# Patient Record
Sex: FEMALE | Race: AMERICAN INDIAN OR ALASKA NATIVE
[De-identification: names, ages, dates, MRNs, and addresses within clinical notes are randomized per-mention and may not be internally consistent; named-entity substitution may affect disease eponyms.]

---

## 2017-10-25 ENCOUNTER — HOSPITAL ENCOUNTER (OUTPATIENT)
Dept: HOSPITAL 5 - SPVIMAG | Age: 72
Discharge: HOME | End: 2017-10-25
Attending: OTOLARYNGOLOGY
Payer: MEDICARE

## 2017-10-25 DIAGNOSIS — R42: ICD-10-CM

## 2017-10-25 DIAGNOSIS — G31.89: Primary | ICD-10-CM

## 2017-10-25 PROCEDURE — 70551 MRI BRAIN STEM W/O DYE: CPT

## 2017-10-25 NOTE — MAGNETIC RESONANCE REPORT
MRI BRAIN WITHOUT CONTRAST: 10/25/17 11:31:00



CLINICAL: Dizziness.



TECHNIQUE: Axial diffusion, T1, T2, FLAIR, gradient echo T2*, and 

sagittal T1 sequences on a 1.5 Rylee magnet.  



FINDINGS: The ventricles are normal size.  Global enlargement of 

cerebral sulci which is disproportionate to ventricular size.  No 

hippocampal atrophy.  No restricted diffusion.  No mass or mass effect. 

 No hemorrhage, edema or extra-axial collection.  Normal pituitary and 

optic chiasm.  The brainstem and cerebellum are normal.  Intact 

vascular flow voids.  The sinuses are clear. The orbits,  and soft 

tissues are normal.  Normal calvarium and skull base.



IMPRESSION: Global cerebral cortical atrophy but otherwise normal.

## 2020-09-26 ENCOUNTER — HOSPITAL ENCOUNTER (OUTPATIENT)
Dept: HOSPITAL 5 - ED | Age: 75
Setting detail: OBSERVATION
LOS: 2 days | Discharge: HOSPICE | End: 2020-09-28
Attending: INTERNAL MEDICINE | Admitting: INTERNAL MEDICINE
Payer: MEDICARE

## 2020-09-26 DIAGNOSIS — Z79.02: ICD-10-CM

## 2020-09-26 DIAGNOSIS — G93.41: ICD-10-CM

## 2020-09-26 DIAGNOSIS — D64.9: ICD-10-CM

## 2020-09-26 DIAGNOSIS — Z90.49: ICD-10-CM

## 2020-09-26 DIAGNOSIS — I10: ICD-10-CM

## 2020-09-26 DIAGNOSIS — R53.81: ICD-10-CM

## 2020-09-26 DIAGNOSIS — E46: ICD-10-CM

## 2020-09-26 DIAGNOSIS — F02.80: ICD-10-CM

## 2020-09-26 DIAGNOSIS — R19.5: ICD-10-CM

## 2020-09-26 DIAGNOSIS — D47.3: ICD-10-CM

## 2020-09-26 DIAGNOSIS — R41.82: Primary | ICD-10-CM

## 2020-09-26 DIAGNOSIS — Z96.642: ICD-10-CM

## 2020-09-26 DIAGNOSIS — E87.6: ICD-10-CM

## 2020-09-26 DIAGNOSIS — Z98.890: ICD-10-CM

## 2020-09-26 DIAGNOSIS — Z79.82: ICD-10-CM

## 2020-09-26 DIAGNOSIS — Z79.01: ICD-10-CM

## 2020-09-26 LAB
ALBUMIN SERPL-MCNC: 3.1 G/DL (ref 3.9–5)
ALT SERPL-CCNC: 27 UNITS/L (ref 7–56)
APTT BLD: 29.4 SEC. (ref 24.2–36.6)
BACTERIA #/AREA URNS HPF: (no result) /HPF
BASOPHILS # (AUTO): 0.1 K/MM3 (ref 0–0.1)
BASOPHILS NFR BLD AUTO: 1.3 % (ref 0–1.8)
BILIRUB UR QL STRIP: (no result)
BLOOD UR QL VISUAL: (no result)
BUN SERPL-MCNC: 12 MG/DL (ref 7–17)
BUN/CREAT SERPL: 24 %
CALCIUM SERPL-MCNC: 8.8 MG/DL (ref 8.4–10.2)
EOSINOPHIL # BLD AUTO: 0 K/MM3 (ref 0–0.4)
EOSINOPHIL NFR BLD AUTO: 0.3 % (ref 0–4.3)
HCT VFR BLD CALC: 29.7 % (ref 30.3–42.9)
HEMOLYSIS INDEX: 12
HGB BLD-MCNC: 9.9 GM/DL (ref 10.1–14.3)
INR PPP: 1.02 (ref 0.87–1.13)
LYMPHOCYTES # BLD AUTO: 0.9 K/MM3 (ref 1.2–5.4)
LYMPHOCYTES NFR BLD AUTO: 15 % (ref 13.4–35)
MCHC RBC AUTO-ENTMCNC: 33 % (ref 30–34)
MCV RBC AUTO: 96 FL (ref 79–97)
MONOCYTES # (AUTO): 0.6 K/MM3 (ref 0–0.8)
MONOCYTES % (AUTO): 8.8 % (ref 0–7.3)
PH UR STRIP: 7 [PH] (ref 5–7)
PLATELET # BLD: 682 K/MM3 (ref 140–440)
PROT UR STRIP-MCNC: (no result) MG/DL
RBC # BLD AUTO: 3.08 M/MM3 (ref 3.65–5.03)
RBC #/AREA URNS HPF: 3 /HPF (ref 0–6)
T4 FREE SERPL-MCNC: 1.23 NG/DL (ref 0.76–1.46)
UROBILINOGEN UR-MCNC: 4 MG/DL (ref ?–2)
WBC #/AREA URNS HPF: 0 /HPF (ref 0–6)

## 2020-09-26 PROCEDURE — 84439 ASSAY OF FREE THYROXINE: CPT

## 2020-09-26 PROCEDURE — 97166 OT EVAL MOD COMPLEX 45 MIN: CPT

## 2020-09-26 PROCEDURE — 70450 CT HEAD/BRAIN W/O DYE: CPT

## 2020-09-26 PROCEDURE — 85610 PROTHROMBIN TIME: CPT

## 2020-09-26 PROCEDURE — 82550 ASSAY OF CK (CPK): CPT

## 2020-09-26 PROCEDURE — 80053 COMPREHEN METABOLIC PANEL: CPT

## 2020-09-26 PROCEDURE — 36415 COLL VENOUS BLD VENIPUNCTURE: CPT

## 2020-09-26 PROCEDURE — 96361 HYDRATE IV INFUSION ADD-ON: CPT

## 2020-09-26 PROCEDURE — 71045 X-RAY EXAM CHEST 1 VIEW: CPT

## 2020-09-26 PROCEDURE — 82962 GLUCOSE BLOOD TEST: CPT

## 2020-09-26 PROCEDURE — 96376 TX/PRO/DX INJ SAME DRUG ADON: CPT

## 2020-09-26 PROCEDURE — G0378 HOSPITAL OBSERVATION PER HR: HCPCS

## 2020-09-26 PROCEDURE — 83735 ASSAY OF MAGNESIUM: CPT

## 2020-09-26 PROCEDURE — 84484 ASSAY OF TROPONIN QUANT: CPT

## 2020-09-26 PROCEDURE — 97161 PT EVAL LOW COMPLEX 20 MIN: CPT

## 2020-09-26 PROCEDURE — 96374 THER/PROPH/DIAG INJ IV PUSH: CPT

## 2020-09-26 PROCEDURE — 99285 EMERGENCY DEPT VISIT HI MDM: CPT

## 2020-09-26 PROCEDURE — 84443 ASSAY THYROID STIM HORMONE: CPT

## 2020-09-26 PROCEDURE — 82271 OCCULT BLOOD OTHER SOURCES: CPT

## 2020-09-26 PROCEDURE — C9113 INJ PANTOPRAZOLE SODIUM, VIA: HCPCS

## 2020-09-26 PROCEDURE — 80048 BASIC METABOLIC PNL TOTAL CA: CPT

## 2020-09-26 PROCEDURE — 81001 URINALYSIS AUTO W/SCOPE: CPT

## 2020-09-26 PROCEDURE — 85025 COMPLETE CBC W/AUTO DIFF WBC: CPT

## 2020-09-26 PROCEDURE — 93005 ELECTROCARDIOGRAM TRACING: CPT

## 2020-09-26 PROCEDURE — 73502 X-RAY EXAM HIP UNI 2-3 VIEWS: CPT

## 2020-09-26 PROCEDURE — 85730 THROMBOPLASTIN TIME PARTIAL: CPT

## 2020-09-26 NOTE — XRAY REPORT
CHEST 1 VIEW



INDICATION / CLINICAL INFORMATION:

cough.



COMPARISON: 

9/10/2020



FINDINGS:



SUPPORT DEVICES: None.



HEART / MEDIASTINUM: Stable. 



LUNGS / PLEURA: Previously noted chronic appearing interstitial changes are similar. Biapical pleural
 thickening is similar. No evidence of confluent infiltrate or pleural effusion. No pneumothorax. 



ADDITIONAL FINDINGS: Gas-filled and distended bowel loops are noted in the left upper quadrant of the
 abdomen.



IMPRESSION:

1. No acute findings. No significant interval change.



Signer Name: Moo Bonds MD 

Signed: 9/26/2020 10:24 PM

Workstation Name: [id number]

## 2020-09-26 NOTE — EMERGENCY DEPARTMENT REPORT
ED Altered Mental Status HPI





- General


Stated Complaint: FAILURE TO THRIVE


Time Seen by Provider: 09/26/20 20:45


Source: patient, EMS


Mode of arrival: Stretcher


Limitations: Altered Mental Status





- History of Present Illness


Initial Comments: 





75-year-old female with a past medical history of Alzheimer's dementia, 

hypertension, protein calorie malnutrition, and admission here September 10 

until September 18 with left hip fracture after fall status post Left Bipolar 

Hemiarthroplasty presents to the hospital for altered mental status and mental 

status decline.  Collateral information obtained from patient's daughter Patrice.

 Patient lives at home with her family.  Since she is been discharged from the 

hospital to 18 she has had a steady decline in her mental status and has been 

more confused.  Patient only eats 3 to 4 teaspoons of food at a time and has 

trouble swallowing pills.  She also reports that patient has had diarrhea since 

discharge from the hospital and has recently noted some blood in her stool.  

Persistent cough reported without fever.  Today patient would not wake up this 

evening to eat dinner.  She states upon EMS arrival she was in and out of sleep.

 Patient is awake here and responsive and oriented only to self.  She denies any

physical complaints.  Daughter also states patient has been unable to ambulate 

since surgery.  She has had initial assessment from physical therapist and 

patient cannot even stand.  They have been trying to see Dr. Andrade since his 

surgery but been having trouble arranging for transportation.  They do not have 

any home health nurse/assistant help at home.  As per discharge med list review 

patient was discharged on enoxaparin 40 mg subcu daily.





- Related Data


                                  Previous Rx's











 Medication  Instructions  Recorded  Last Taken  Type


 


Acetaminophen [Acetaminophen TAB] 650 mg PO Q4H PRN  tablet 09/17/20 Unknown Rx


 


Enoxaparin 40 mg SUB-Q QDAY  syringe 09/17/20 Unknown Rx


 


Latanoprost 0.005% 0.005 drops OU QHS  bottle 09/17/20 Unknown Rx


 


Magnesium Hydroxide [Milk of 30 ml PO Q4H PRN  oral.liqd 09/17/20 Unknown Rx





Magnesia]    


 


Memantine 5 mg PO BID  tablet 09/17/20 Unknown Rx


 


amLODIPine 5 mg PO QHS  tablet 09/17/20 Unknown Rx


 


risperiDONE [RisperDAL] 0.25 mg PO QHS  tablet 09/17/20 Unknown Rx


 


Amlodipine Besylate [Norvasc] 5 mg PO DAILY #30 tablet 09/18/20 Unknown Rx


 


Aspirin 81 mg PO DAILY #30 09/18/20 Unknown Rx


 


Metoprolol 12.5 mg PO BID #60 tab 09/18/20 Unknown Rx


 


risperiDONE 0.25 mg PO QHS #30 cap 09/18/20 Unknown Rx











                                    Allergies











Allergy/AdvReac Type Severity Reaction Status Date / Time


 


codeine Allergy  Unknown Verified 09/10/20 19:29


 


Latex, Natural Rubber Allergy  Rash Verified 11/06/14 13:31


 


morphine Allergy  "JUST Verified 11/06/14 13:31





   MAKES ME  





   FEEL SICK"  














ED Review of Systems


ROS: 


Stated complaint: FAILURE TO THRIVE


Other details as noted in HPI





Comment: All other systems reviewed and negative





ED Past Medical Hx





- Past Medical History


Hx Hypertension: Yes


Hx Heart Attack/AMI: No


Hx Liver Disease: No


Hx Renal Disease: No


Hx Sickle Cell Disease: No


Hx Seizures: No


Hx Asthma: No


Hx COPD: No


Hx HIV: No


Additional medical history: dementia





- Surgical History


Hx Cholecystectomy: Yes (1973)





- Social History


Smoking Status: Never Smoker





- Medications


Home Medications: 


                                Home Medications











 Medication  Instructions  Recorded  Confirmed  Last Taken  Type


 


Acetaminophen [Acetaminophen TAB] 650 mg PO Q4H PRN  tablet 09/17/20  Unknown Rx


 


Enoxaparin 40 mg SUB-Q QDAY  syringe 09/17/20  Unknown Rx


 


Latanoprost 0.005% 0.005 drops OU QHS  bottle 09/17/20  Unknown Rx


 


Magnesium Hydroxide [Milk of 30 ml PO Q4H PRN  oral.liqd 09/17/20  Unknown Rx





Magnesia]     


 


Memantine 5 mg PO BID  tablet 09/17/20  Unknown Rx


 


amLODIPine 5 mg PO QHS  tablet 09/17/20  Unknown Rx


 


risperiDONE [RisperDAL] 0.25 mg PO QHS  tablet 09/17/20  Unknown Rx


 


Amlodipine Besylate [Norvasc] 5 mg PO DAILY #30 tablet 09/18/20  Unknown Rx


 


Aspirin 81 mg PO DAILY #30 09/18/20  Unknown Rx


 


Metoprolol 12.5 mg PO BID #60 tab 09/18/20  Unknown Rx


 


risperiDONE 0.25 mg PO QHS #30 cap 09/18/20  Unknown Rx














ED Physical Exam





- Other


Other exam information: 





General: No acute distress


Head: Atraumatic


Eyes: normal appearance


ENT: Moist mucous membranes


Neck: Normal appearance, no midline tenderness


Chest: Clear to auscultation bilaterally


CV: Regular rate and rhythm


Abdomen: Soft, normal bowel sounds, nontender, nondistended, no rebound or 

guarding


Rectal: Brown stool without gross blood guaiac positive


Back: Normal inspection


Extremity: Wound dressing to left lateral hip.  No skin erythema


Neuro: Alert O x 1, no facial asymmetry, speech clear, equal handgrip and foot 

dorsiflexion 


Psych: Appropriate behavior


Skin: No rash 





ED Course


                                   Vital Signs











  09/26/20 09/26/20 09/26/20





  21:00 21:15 22:06


 


Temperature 97.6 F  


 


Pulse Rate 65 76 65


 


Respiratory 18 12 16





Rate   


 


Blood Pressure  135/62 135/62


 


Blood Pressure 128/56  





[Left]   


 


O2 Sat by Pulse 96 99 





Oximetry   














  09/26/20





  22:45


 


Temperature 


 


Pulse Rate 88


 


Respiratory 10 L





Rate 


 


Blood Pressure 144/73


 


Blood Pressure 





[Left] 


 


O2 Sat by Pulse 99





Oximetry 














- Consultations


Consultation #1: 





09/27/20 00:11


case was d/w Dr Santo sanchez , no acute intervention necessary at this time. C

onsult ordered





- Lab Data


Result diagrams: 


                                 09/26/20 21:19





                                 09/26/20 21:19


                                   Lab Results











  09/26/20 09/26/20 09/26/20 Range/Units





  21:19 21:19 21:19 


 


WBC  6.3    (4.5-11.0)  K/mm3


 


RBC  3.08 L    (3.65-5.03)  M/mm3


 


Hgb  9.9 L    (10.1-14.3)  gm/dl


 


Hct  29.7 L    (30.3-42.9)  %


 


MCV  96    (79-97)  fl


 


MCH  32    (28-32)  pg


 


MCHC  33    (30-34)  %


 


RDW  14.7    (13.2-15.2)  %


 


Plt Count  682 H    (140-440)  K/mm3


 


Lymph % (Auto)  15.0    (13.4-35.0)  %


 


Mono % (Auto)  8.8 H    (0.0-7.3)  %


 


Eos % (Auto)  0.3    (0.0-4.3)  %


 


Baso % (Auto)  1.3    (0.0-1.8)  %


 


Lymph # (Auto)  0.9 L    (1.2-5.4)  K/mm3


 


Mono # (Auto)  0.6    (0.0-0.8)  K/mm3


 


Eos # (Auto)  0.0    (0.0-0.4)  K/mm3


 


Baso # (Auto)  0.1    (0.0-0.1)  K/mm3


 


Seg Neutrophils %  74.6 H    (40.0-70.0)  %


 


Seg Neutrophils #  4.7    (1.8-7.7)  K/mm3


 


PT   13.5   (12.2-14.9)  Sec.


 


INR   1.02   (0.87-1.13)  


 


APTT   29.4   (24.2-36.6)  Sec.


 


Sodium    143  (137-145)  mmol/L


 


Potassium    3.6  (3.6-5.0)  mmol/L


 


Chloride    103.8  ()  mmol/L


 


Carbon Dioxide    27  (22-30)  mmol/L


 


Anion Gap    16  mmol/L


 


BUN    12  (7-17)  mg/dL


 


Creatinine    0.5 L  (0.6-1.2)  mg/dL


 


Estimated GFR    > 60  ml/min


 


BUN/Creatinine Ratio    24  %


 


Glucose    92  ()  mg/dL


 


Calcium    8.8  (8.4-10.2)  mg/dL


 


Magnesium     (1.7-2.3)  mg/dL


 


Total Bilirubin    0.40  (0.1-1.2)  mg/dL


 


AST    40  (5-40)  units/L


 


ALT    27  (7-56)  units/L


 


Alkaline Phosphatase    81  ()  units/L


 


Total Creatine Kinase    417 H  ()  units/L


 


Troponin T     (0.00-0.029)  ng/mL


 


Total Protein    6.2 L  (6.3-8.2)  g/dL


 


Albumin    3.1 L  (3.9-5)  g/dL


 


Albumin/Globulin Ratio    1.0  %


 


TSH     (0.270-4.200)  mlU/mL


 


Free T4     (0.76-1.46)  ng/dL


 


Urine Color     (Yellow)  


 


Urine Turbidity     (Clear)  


 


Urine pH     (5.0-7.0)  


 


Ur Specific Gravity     (1.003-1.030)  


 


Urine Protein     (Negative)  mg/dL


 


Urine Glucose (UA)     (Negative)  mg/dL


 


Urine Ketones     (Negative)  mg/dL


 


Urine Blood     (Negative)  


 


Urine Nitrite     (Negative)  


 


Urine Bilirubin     (Negative)  


 


Urine Urobilinogen     (<2.0)  mg/dL


 


Ur Leukocyte Esterase     (Negative)  


 


Urine WBC (Auto)     (0.0-6.0)  /HPF


 


Urine RBC (Auto)     (0.0-6.0)  /HPF


 


U Epithel Cells (Auto)     (0-13.0)  /HPF


 


Urine Bacteria (Auto)     (Negative)  /HPF














  09/26/20 09/26/20 09/26/20 Range/Units





  21:19 21:19 21:19 


 


WBC     (4.5-11.0)  K/mm3


 


RBC     (3.65-5.03)  M/mm3


 


Hgb     (10.1-14.3)  gm/dl


 


Hct     (30.3-42.9)  %


 


MCV     (79-97)  fl


 


MCH     (28-32)  pg


 


MCHC     (30-34)  %


 


RDW     (13.2-15.2)  %


 


Plt Count     (140-440)  K/mm3


 


Lymph % (Auto)     (13.4-35.0)  %


 


Mono % (Auto)     (0.0-7.3)  %


 


Eos % (Auto)     (0.0-4.3)  %


 


Baso % (Auto)     (0.0-1.8)  %


 


Lymph # (Auto)     (1.2-5.4)  K/mm3


 


Mono # (Auto)     (0.0-0.8)  K/mm3


 


Eos # (Auto)     (0.0-0.4)  K/mm3


 


Baso # (Auto)     (0.0-0.1)  K/mm3


 


Seg Neutrophils %     (40.0-70.0)  %


 


Seg Neutrophils #     (1.8-7.7)  K/mm3


 


PT     (12.2-14.9)  Sec.


 


INR     (0.87-1.13)  


 


APTT     (24.2-36.6)  Sec.


 


Sodium     (137-145)  mmol/L


 


Potassium     (3.6-5.0)  mmol/L


 


Chloride     ()  mmol/L


 


Carbon Dioxide     (22-30)  mmol/L


 


Anion Gap     mmol/L


 


BUN     (7-17)  mg/dL


 


Creatinine     (0.6-1.2)  mg/dL


 


Estimated GFR     ml/min


 


BUN/Creatinine Ratio     %


 


Glucose     ()  mg/dL


 


Calcium     (8.4-10.2)  mg/dL


 


Magnesium  2.10    (1.7-2.3)  mg/dL


 


Total Bilirubin     (0.1-1.2)  mg/dL


 


AST     (5-40)  units/L


 


ALT     (7-56)  units/L


 


Alkaline Phosphatase     ()  units/L


 


Total Creatine Kinase     ()  units/L


 


Troponin T    0.023  (0.00-0.029)  ng/mL


 


Total Protein     (6.3-8.2)  g/dL


 


Albumin     (3.9-5)  g/dL


 


Albumin/Globulin Ratio     %


 


TSH   1.900   (0.270-4.200)  mlU/mL


 


Free T4   1.23   (0.76-1.46)  ng/dL


 


Urine Color     (Yellow)  


 


Urine Turbidity     (Clear)  


 


Urine pH     (5.0-7.0)  


 


Ur Specific Gravity     (1.003-1.030)  


 


Urine Protein     (Negative)  mg/dL


 


Urine Glucose (UA)     (Negative)  mg/dL


 


Urine Ketones     (Negative)  mg/dL


 


Urine Blood     (Negative)  


 


Urine Nitrite     (Negative)  


 


Urine Bilirubin     (Negative)  


 


Urine Urobilinogen     (<2.0)  mg/dL


 


Ur Leukocyte Esterase     (Negative)  


 


Urine WBC (Auto)     (0.0-6.0)  /HPF


 


Urine RBC (Auto)     (0.0-6.0)  /HPF


 


U Epithel Cells (Auto)     (0-13.0)  /HPF


 


Urine Bacteria (Auto)     (Negative)  /HPF














  09/26/20 Range/Units





  22:54 


 


WBC   (4.5-11.0)  K/mm3


 


RBC   (3.65-5.03)  M/mm3


 


Hgb   (10.1-14.3)  gm/dl


 


Hct   (30.3-42.9)  %


 


MCV   (79-97)  fl


 


MCH   (28-32)  pg


 


MCHC   (30-34)  %


 


RDW   (13.2-15.2)  %


 


Plt Count   (140-440)  K/mm3


 


Lymph % (Auto)   (13.4-35.0)  %


 


Mono % (Auto)   (0.0-7.3)  %


 


Eos % (Auto)   (0.0-4.3)  %


 


Baso % (Auto)   (0.0-1.8)  %


 


Lymph # (Auto)   (1.2-5.4)  K/mm3


 


Mono # (Auto)   (0.0-0.8)  K/mm3


 


Eos # (Auto)   (0.0-0.4)  K/mm3


 


Baso # (Auto)   (0.0-0.1)  K/mm3


 


Seg Neutrophils %   (40.0-70.0)  %


 


Seg Neutrophils #   (1.8-7.7)  K/mm3


 


PT   (12.2-14.9)  Sec.


 


INR   (0.87-1.13)  


 


APTT   (24.2-36.6)  Sec.


 


Sodium   (137-145)  mmol/L


 


Potassium   (3.6-5.0)  mmol/L


 


Chloride   ()  mmol/L


 


Carbon Dioxide   (22-30)  mmol/L


 


Anion Gap   mmol/L


 


BUN   (7-17)  mg/dL


 


Creatinine   (0.6-1.2)  mg/dL


 


Estimated GFR   ml/min


 


BUN/Creatinine Ratio   %


 


Glucose   ()  mg/dL


 


Calcium   (8.4-10.2)  mg/dL


 


Magnesium   (1.7-2.3)  mg/dL


 


Total Bilirubin   (0.1-1.2)  mg/dL


 


AST   (5-40)  units/L


 


ALT   (7-56)  units/L


 


Alkaline Phosphatase   ()  units/L


 


Total Creatine Kinase   ()  units/L


 


Troponin T   (0.00-0.029)  ng/mL


 


Total Protein   (6.3-8.2)  g/dL


 


Albumin   (3.9-5)  g/dL


 


Albumin/Globulin Ratio   %


 


TSH   (0.270-4.200)  mlU/mL


 


Free T4   (0.76-1.46)  ng/dL


 


Urine Color  Yellow  (Yellow)  


 


Urine Turbidity  Slightly-cloudy  (Clear)  


 


Urine pH  7.0  (5.0-7.0)  


 


Ur Specific Gravity  1.017  (1.003-1.030)  


 


Urine Protein  <15 mg/dl  (Negative)  mg/dL


 


Urine Glucose (UA)  Neg  (Negative)  mg/dL


 


Urine Ketones  20  (Negative)  mg/dL


 


Urine Blood  Neg  (Negative)  


 


Urine Nitrite  Neg  (Negative)  


 


Urine Bilirubin  Neg  (Negative)  


 


Urine Urobilinogen  4.0  (<2.0)  mg/dL


 


Ur Leukocyte Esterase  Neg  (Negative)  


 


Urine WBC (Auto)  0.0  (0.0-6.0)  /HPF


 


Urine RBC (Auto)  3.0  (0.0-6.0)  /HPF


 


U Epithel Cells (Auto)  < 1.0  (0-13.0)  /HPF


 


Urine Bacteria (Auto)  1+  (Negative)  /HPF














- EKG Data


-: EKG Interpreted by Me (a lot of artifact)


EKG shows normal: sinus rhythm, ST-T waves (no stemi)


Rate: normal





- Radiology Data


Radiology results: report reviewed





 CT HEAD WITHOUT CONTRAST 





INDICATION: dimnished mental status, dementia 





TECHNIQUE: All CT scans at this location are performed using CT dose reduction 

for ALARA by means 


 of automated exposure control. 





COMPARISON: 9/10/2020 





FINDINGS: 





BRAIN: No hemorrhage or mass effect are seen. No evidence of acute infarction is

 noted. Atrophic 


 changes are again seen. 





ORBITS: Normal as visualized. 


SOFT TISSUES OF HEAD: Normal. 


CALVARIUM: Normal. 


VISUALIZED PARANASAL SINUSES AND MASTOID AIR CELLS: Clear. 





ADDITIONAL FINDINGS: None. 





IMPRESSION: No acute intracranial abnormality. 





- Medical Decision Making





Patient does have mild anemia with a very mild decrease in hemoglobin compared 

to most recent value.  Patient has guaiac positive brown stools.  As per 

ambulatory orders patient was discharged on Lovenox however, I am unsure, he 

states she is supposed to be on this medication.  Family is having difficulty 

caring for patient at home and initiating PT OT due to significant debility.  

Patient may benefit from subacute rehab placement.  Suspected transient no 

awareness of secondary to dementia the patient will be admitted for further 

observation and work-up.  CT head unremarkable.  No signs of UTI.  Patient is 

EKG appears to be sinus rhythm although a lot of artifact upon multiple attempts

 due to patient crossing her arms.  No signs of ischemia.  And troponin is 

within negative range


Critical Care Time: No


Critical care attestation.: 


If time is entered above; I have spent that time in minutes in the direct care 

of this critically ill patient, excluding procedure time.








ED Disposition


Clinical Impression: 


 Transient alteration of awareness, Dementia, History of arthroplasty of left 

hip, Debility, Guaiac + stool, Anemia, Current use of long term anticoagulation





Disposition: DC-09 OP ADMIT IP TO THIS HOSP


Is pt being admited?: Yes


Condition: Stable


Time of Disposition: 00:11 (Dr Gunderson/hosp)

## 2020-09-26 NOTE — XRAY REPORT
LEFT HIP 2 VIEWS [id number]



INDICATION: post op, unable to ambulate



COMPARISON: 9/13/2020



FINDINGS: Left hip prosthesis appears to be in satisfactory position. No fractures or dislocations ar
e seen. No obvious changes are noted.







Signer Name: Donta Wang MD 

Signed: 9/26/2020 10:34 PM

Workstation Name: [id number]

## 2020-09-26 NOTE — CAT SCAN REPORT
CT HEAD WITHOUT CONTRAST



INDICATION: dimnished mental status, dementia



TECHNIQUE: All CT scans at this location are performed using CT dose reduction for ALARA by means of 
automated exposure control. 



COMPARISON: 9/10/2020



FINDINGS:



BRAIN: No hemorrhage or mass effect are seen. No evidence of acute infarction is noted. Atrophic hall
ges are again seen.



ORBITS: Normal as visualized.

SOFT TISSUES OF HEAD: Normal.

CALVARIUM: Normal.

VISUALIZED PARANASAL SINUSES AND MASTOID AIR CELLS: Clear.



ADDITIONAL FINDINGS: None.



IMPRESSION: No acute intracranial abnormality.



Signer Name: Donta Wang MD 

Signed: 9/26/2020 9:52 PM

Workstation Name: [id number]

## 2020-09-27 RX ADMIN — Medication SCH ML: at 09:10

## 2020-09-27 RX ADMIN — SODIUM CHLORIDE SCH MLS/HR: 0.9 INJECTION, SOLUTION INTRAVENOUS at 02:40

## 2020-09-27 RX ADMIN — Medication SCH ML: at 21:06

## 2020-09-27 RX ADMIN — SODIUM CHLORIDE SCH MLS/HR: 0.9 INJECTION, SOLUTION INTRAVENOUS at 15:30

## 2020-09-27 RX ADMIN — PANTOPRAZOLE SODIUM SCH MG: 40 INJECTION, POWDER, FOR SOLUTION INTRAVENOUS at 20:45

## 2020-09-27 NOTE — HISTORY AND PHYSICAL REPORT
History of Present Illness


Date of examination: 09/27/20


Date of admission: 


09/27/2020


Chief complaint: 





Altered mental Status


History of present illness: 


75-year-old female with known history of dementia, hypertension, history of 

protein calorie malnutrition recently discharged from this hospital after having

a left hip surgery status post fall was brought into the emergency room today 

for altered mental status.  Patient was discharged home on September 18, 2020 a

nd according to daughter patient has been having a steady decline in her mental 

status and has become more confused.





Patient has had decreased oral intake and has been having some trouble 

swallowing pills.  She has also had some diarrhea which has recently become 

bloody.  Patient has had some cough which is nonproductive.  There has been no 

fever or chills, no nausea vomiting, no hematuria or dysuria.





According to daughter who provided most of the information, she indicates he has

been trying to make an appointment to see Dr. Andrade the orthopedic surgeon 

since his surgery but they have been  having transportation problems.


Patient has had initial assessment by physical therapist She was discharged on 

SQ Lovenox 40 mg daily.





Stool Hemoccult in the emergency room done by the ER physician today was guaiac 

positive.





Patient is being evaluated for altered mental status and GI bleed.





Past History


Past Medical History: hypertension, other (Dementia)


Past Surgical History: cholecystectomy, total hip replacement (Left hip r

eplacement.)


Social history: no significant social history


Family history: no significant family history





Medications and Allergies


                                    Allergies











Allergy/AdvReac Type Severity Reaction Status Date / Time


 


codeine Allergy  Unknown Verified 09/10/20 19:29


 


Latex, Natural Rubber Allergy  Rash Verified 11/06/14 13:31


 


morphine Allergy  "JUST Verified 11/06/14 13:31





   MAKES ME  





   FEEL SICK"  











                                Home Medications











 Medication  Instructions  Recorded  Confirmed  Last Taken  Type


 


Acetaminophen [Acetaminophen TAB] 650 mg PO Q4H PRN  tablet 09/17/20  Unknown Rx


 


Enoxaparin 40 mg SUB-Q QDAY  syringe 09/17/20  Unknown Rx


 


Latanoprost 0.005% 0.005 drops OU QHS  bottle 09/17/20  Unknown Rx


 


Magnesium Hydroxide [Milk of 30 ml PO Q4H PRN  oral.liqd 09/17/20  Unknown Rx





Magnesia]     


 


Memantine 5 mg PO BID  tablet 09/17/20  Unknown Rx


 


amLODIPine 5 mg PO QHS  tablet 09/17/20  Unknown Rx


 


risperiDONE [RisperDAL] 0.25 mg PO QHS  tablet 09/17/20  Unknown Rx


 


Amlodipine Besylate [Norvasc] 5 mg PO DAILY #30 tablet 09/18/20  Unknown Rx


 


Aspirin 81 mg PO DAILY #30 09/18/20  Unknown Rx


 


Metoprolol 12.5 mg PO BID #60 tab 09/18/20  Unknown Rx


 


risperiDONE 0.25 mg PO QHS #30 cap 09/18/20  Unknown Rx











Active Meds: 


Active Medications





Acetaminophen (Tylenol)  650 mg PO Q4H PRN


   PRN Reason: Pain MILD(1-3)/Fever >100.5/HA


Sodium Chloride (Nacl 0.9% 1000 Ml)  1,000 mls @ 75 mls/hr IV AS DIRECT JOSE


Magnesium Hydroxide (Milk Of Magnesia)  30 ml PO Q4H PRN


   PRN Reason: Constipation


Ondansetron HCl (Zofran)  4 mg IV Q8H PRN


   PRN Reason: Nausea And Vomiting


Sodium Chloride (Sodium Chloride Flush Syringe 10 Ml)  10 ml IV BID JOSE


Sodium Chloride (Sodium Chloride Flush Syringe 10 Ml)  10 ml IV PRN PRN


   PRN Reason: LINE FLUSH











Review of Systems


ROS unobtainable: due to mental status





Exam





- Constitutional


Vitals: 


                                        











Temp Pulse Resp BP Pulse Ox


 


 97.6 F   88   10 L  144/73   99 


 


 09/26/20 21:00  09/26/20 22:45  09/26/20 22:45  09/26/20 22:45  09/26/20 22:45











General appearance: Present: no acute distress, well-nourished





- EENT


Eyes: Present: PERRL, EOM intact.  Absent: scleral icterus


ENT: hearing intact, clear oral mucosa, dentition normal





- Neck


Neck: Present: supple, normal ROM





- Respiratory


Respiratory effort: normal


Respiratory: bilateral: CTA





- Cardiovascular


Rhythm: regular


Heart Sounds: Present: S1 & S2.  Absent: gallop, systolic murmur, diastolic 

murmur, rub





- Extremities


Extremities: no ischemia, pulses intact, pulses symmetrical, No edema, Full ROM


Peripheral Pulses: within normal limits





- Abdominal


General gastrointestinal: Present: soft, non-tender, non-distended, normal bowel

 sounds





- Integumentary


Integumentary: Present: clear, warm, dry





- Musculoskeletal


Musculoskeletal: strength equal bilaterally, other (Dressing over left hip.)





- Psychiatric


Psychiatric: cooperative





- Neurologic


Neurologic: CNII-XII intact, no focal deficits, moves all extremities





HEART Score





- HEART Score


Troponin: 


                                        











Troponin T  0.023 ng/mL (0.00-0.029)   09/26/20  21:19    














Results





- Labs


CBC & Chem 7: 


                                 09/26/20 21:19





                                 09/26/20 21:19


Labs: 


                              Abnormal lab results











  09/26/20 09/26/20 Range/Units





  21:19 21:19 


 


RBC  3.08 L   (3.65-5.03)  M/mm3


 


Hgb  9.9 L   (10.1-14.3)  gm/dl


 


Hct  29.7 L   (30.3-42.9)  %


 


Plt Count  682 H   (140-440)  K/mm3


 


Mono % (Auto)  8.8 H   (0.0-7.3)  %


 


Lymph # (Auto)  0.9 L   (1.2-5.4)  K/mm3


 


Seg Neutrophils %  74.6 H   (40.0-70.0)  %


 


Creatinine   0.5 L  (0.6-1.2)  mg/dL


 


Total Creatine Kinase   417 H  ()  units/L


 


Total Protein   6.2 L  (6.3-8.2)  g/dL


 


Albumin   3.1 L  (3.9-5)  g/dL














Assessment and Plan





- Patient Problems


(1) Altered mental status


Current Visit: Yes   Status: Acute   


Plan to address problem: 


Etiology unclear.  Will monitor mental status.


Patient has baseline history of dementia








(2) Anemia


Current Visit: Yes   Status: Acute   


Plan to address problem: 


Possibly secondary to GI bleed.  However no significant drop from previous.  

Will monitor CBC.


Consult has been placed to gastroenterology for evaluation.








(3) Dementia


Current Visit: Yes   Status: Acute   


Plan to address problem: 


We will resume routine home medications.








(4) Guaiac + stool


Current Visit: Yes   Status: Acute   


Plan to address problem: 


We will await further evaluation by gastroenterology.


Patient has been on anticoagulation for DVT prophylaxis status post hip 

replacement.








(5) History of arthroplasty of left hip


Current Visit: Yes   Status: Acute   


Plan to address problem: 


We will continue on analgesic medication as needed.


Will await follow-up by orthopedic surgeon Dr. Andrade








(6) DVT prophylaxis


Current Visit: No   Status: Acute   


Plan to address problem: 


Meanwhile patient placed on sequential compression device.








(7) Full code status


Current Visit: No   Status: Acute

## 2020-09-27 NOTE — CONSULTATION
History of Present Illness





- Reason for Consult


Consult date: 09/27/20


Heme + stool


Requesting physician: BILLY BYNUM





- History of Present Illness


76 yo BF admitted from home, and found to have Heme + stool and anemia.  Pt was 

in hospital with L hip fx and underwent surgery, during 9/10 - 9/17 stay.  She 

has been home and has not been able to get PT or other assistance.  She has 

suffered progressive decline in MS, with confusion.  She has also had diarrhea, 

with BMs 3-4x/d, and loose.  Yesterday, daughter saw reddish tinge to brown 

stool.  Usu, she has constipation and needs laxatives.  She has been eating 

minimally.  No other clear discomfort.


Per daughter, Patrice Sotelo, pt has had progressive dementia for 4 yrs.  Has 

never eaten much.  She is unsure of weight loss.  No hx of abd pain or 

discomfort, N/V, gregoria GI bleed.


Meds reviewed.








Past History


Past Medical History: hypertension, other (Dementia)


Past Surgical History: cholecystectomy, total hip replacement (Left hip 

replacement.)


Social history: no significant social history


Family history: no significant family history





Medications and Allergies


                                    Allergies











Allergy/AdvReac Type Severity Reaction Status Date / Time


 


codeine Allergy  Unknown Verified 09/10/20 19:29


 


Latex, Natural Rubber Allergy  Rash Verified 11/06/14 13:31


 


morphine Allergy  "JUST Verified 11/06/14 13:31





   MAKES ME  





   FEEL SICK"  











                                Home Medications











 Medication  Instructions  Recorded  Confirmed  Last Taken  Type


 


Acetaminophen [Acetaminophen TAB] 650 mg PO Q4H PRN  tablet 09/17/20  Unknown Rx


 


Enoxaparin 40 mg SUB-Q QDAY  syringe 09/17/20  Unknown Rx


 


Latanoprost 0.005% 0.005 drops OU QHS  bottle 09/17/20  Unknown Rx


 


Magnesium Hydroxide [Milk of 30 ml PO Q4H PRN  oral.liqd 09/17/20  Unknown Rx





Magnesia]     


 


Memantine 5 mg PO BID  tablet 09/17/20  Unknown Rx


 


amLODIPine 5 mg PO QHS  tablet 09/17/20  Unknown Rx


 


risperiDONE [RisperDAL] 0.25 mg PO QHS  tablet 09/17/20  Unknown Rx


 


Amlodipine Besylate [Norvasc] 5 mg PO DAILY #30 tablet 09/18/20  Unknown Rx


 


Aspirin 81 mg PO DAILY #30 09/18/20  Unknown Rx


 


Metoprolol 12.5 mg PO BID #60 tab 09/18/20  Unknown Rx


 


risperiDONE 0.25 mg PO QHS #30 cap 09/18/20  Unknown Rx











Active Meds: 


Active Medications





Acetaminophen (Tylenol)  650 mg PO Q4H PRN


   PRN Reason: Pain MILD(1-3)/Fever >100.5/HA


Sodium Chloride (Nacl 0.9% 1000 Ml)  1,000 mls @ 75 mls/hr IV AS DIRECT JOSE


   Last Admin: 09/27/20 15:30 Dose:  75 mls/hr


   Documented by: 


Magnesium Hydroxide (Milk Of Magnesia)  30 ml PO Q4H PRN


   PRN Reason: Constipation


Ondansetron HCl (Zofran)  4 mg IV Q8H PRN


   PRN Reason: Nausea And Vomiting


Pantoprazole Sodium (Protonix)  40 mg IV QDAY [facility]


Sodium Chloride (Sodium Chloride Flush Syringe 10 Ml)  10 ml IV BID [facility]


   Last Admin: 09/27/20 09:10 Dose:  10 ml


   Documented by: 


Sodium Chloride (Sodium Chloride Flush Syringe 10 Ml)  10 ml IV PRN PRN


   PRN Reason: LINE FLUSH











Review of Systems


ROS unobtainable: due to mental status





Exam





- Constitutional


Vitals: 


                                        











Temp Pulse Resp BP Pulse Ox


 


 97.6 F   62   17   132/66   100 


 


 09/26/20 21:00  09/27/20 01:51  09/27/20 04:08  09/27/20 01:51  09/27/20 01:51











General appearance: Present: no acute distress, cachectic, other (Stiff, 

slightly contractured)





- EENT


Eyes: Present: PERRL, EOM intact


ENT: hearing intact





- Respiratory


Respiratory effort: normal


Respiratory: bilateral: CTA





- Cardiovascular


Rhythm: regular


Heart Sounds: Present: S1 & S2





- Extremities


Extremities: No edema





- Abdominal


General gastrointestinal: Present: soft, non-tender





- Rectal


Rectal Exam: other (Light yellow-brown stool, no blood)





Results





- Labs


CBC & Chem 7: 


                                 09/26/20 21:19





                                 09/26/20 21:19


Labs: 


                              Abnormal lab results











  09/26/20 09/26/20 Range/Units





  21:19 21:19 


 


RBC  3.08 L   (3.65-5.03)  M/mm3


 


Hgb  9.9 L   (10.1-14.3)  gm/dl


 


Hct  29.7 L   (30.3-42.9)  %


 


Plt Count  682 H   (140-440)  K/mm3


 


Mono % (Auto)  8.8 H   (0.0-7.3)  %


 


Lymph # (Auto)  0.9 L   (1.2-5.4)  K/mm3


 


Seg Neutrophils %  74.6 H   (40.0-70.0)  %


 


Creatinine   0.5 L  (0.6-1.2)  mg/dL


 


Total Creatine Kinase   417 H  ()  units/L


 


Total Protein   6.2 L  (6.3-8.2)  g/dL


 


Albumin   3.1 L  (3.9-5)  g/dL














Assessment and Plan


1.  Anemia - with Heme + stool.  No gregoria GI bleed noted.  Hgb 9.9 on admission,

down from 10.3 on 9/12/20 (most recent prior lab).  Anemia likely multifactorial

and due to surgery, ACD.  Pt is a poor candidate for endoscopic evaluation, and 

GI causes are possible but low yield at this point.


- empiric PPI


- monitor H/H and transfuse as needed





2.  Diarrhea - after surgery.


- check stool for C diff and WBC





3.  Malnutrition - monitor po intake.  If poor, may need PEG.  Discussion 

initiated with pt's daughter.





4.  Goals of care - this needs to be discussed with family, and prognosis for 

meaningful recovery from surgery, with participation in PT considered and 

discussed with family.


- will defer to Hospitalist

## 2020-09-27 NOTE — EVENT NOTE
Date: 09/27/20


I called patient's daughter Ms. Deepak woods at  and updated 

patient's condition Treatment plan.  Patient's daughter is concerned about her 

generalized weakness, unable to stand up, and bloodstained diarrhea.  I informed

that the GI evaluation and Ortho evaluation is pending.  And will discuss with 

case management about the discharge planning.  I also encouraged Ms. Sotelo to 

call case management tomorrow and discuss about DC needs for her mother.


She verbalized understanding .  I informed patient's nurse about my discussions 

with the patient's daughter Ms. Sotelo

## 2020-09-27 NOTE — PROGRESS NOTE
Assessment and Plan


Assessment and plan: 


--Metabolic encephalopathy


Current Visit: Yes   Status: Acute   


Plan to address problem: 


Underlying baseline dementia, advanced age


Postop state, severe malnutrition 


Supportive care, nutrition supplements





--Heme + stool


Current Visit: Yes   Status: Acute   


Plan to address problem: 


Pending evaluation by gastroenterology.


Patient has been on anticoagulation for


DVT prophylaxis status post hip replacement.





--Mild anemia


Current Visit: Yes   Status: Acute   


Plan to address problem: 


Patient's Hb 2 weeks ago was 11 , today 9.9


possibly secondary to GI bleed postop state


Will monitor H&H, follow GI evaluation and recommendation


Transfuse as needed





--reactive thrombocytosis


Current Visit: Yes   Status: Acute   


Plan to address problem: 


Closely monitor





-- Dementia


Current Visit: Yes   Status: Acute   


Plan to address problem: 


We will resume routine home medications.  





-- History of arthroplasty of left hip


Current Visit: Yes   Status: Acute   


Plan to address problem: 


We will continue on analgesic medication as needed.


Will await follow-up by orthopedic surgeon Dr. Andrade


PT OT as needed





--Severe malnutrition; BMI 18.5


Current Visit: yes  Status: Acute   


Plan to address problem: 


Hypoalbuminemia


Nutrition supplements, supportive care


Nutrition consult





--DVT prophylaxis


Current Visit: No   Status: Acute   


Plan to address problem: 


Meanwhile patient placed on sequential compression device. 





--Full code status


Current Visit: No   Status: Acute   





Closely monitor the patient and adjust management as needed


Follow GI and Ortho evaluation and recommendations





I called patient's daughter Ms. Sotelo, [phone] and updated patient's 

condition, treatment plan


She had many questions answered all of them, encouraged her to talk to the case 

manager tomorrow


For any of her mother's discharge needs.





History


Interval history: 


I have seen and examined the patient at the bedside this morning, patient's 

chart and medications reviewed


Patient is lethargic, however responds to simple questions appropriately, very 

weak and cachectic.


Patient was admitted with altered level of consciousness, generalized weakness, 

diarrhea with bloodstained stool


Heme positive stool test.  Pending GI evaluation


Vital signs reviewed








Hospitalist Physical





- Constitutional


Vitals: 


                                        











Temp Pulse Resp BP Pulse Ox


 


 97.6 F   62   17   132/66   100 


 


 09/26/20 21:00  09/27/20 01:51  09/27/20 04:08  09/27/20 01:51  09/27/20 01:51











General appearance: Present: no acute distress, well-nourished





- EENT


Eyes: Present: PERRL, EOM intact





- Neck


Neck: Present: supple, normal ROM





- Respiratory


Respiratory effort: normal


Respiratory: bilateral: diminished, negative: rales, rhonchi, wheezing





- Cardiovascular


Rhythm: regular


Heart Sounds: Present: S1 & S2





- Extremities


Extremities: no ischemia, No edema





- Abdominal


General gastrointestinal: soft, non-tender, non-distended, normal bowel sounds





- Integumentary


Integumentary: Present: clear, warm





- Psychiatric


Psychiatric: appropriate mood/affect, other (Minimally communicative)





- Neurologic


Neurologic: moves all extremities





HEART Score





- HEART Score


Troponin: 


                                        











Troponin T  0.023 ng/mL (0.00-0.029)   09/26/20  21:19    














Results





- Labs


CBC & Chem 7: 


                                 09/26/20 21:19





                                 09/26/20 21:19


Labs: 


                             Laboratory Last Values











WBC  6.3 K/mm3 (4.5-11.0)   09/26/20  21:19    


 


RBC  3.08 M/mm3 (3.65-5.03)  L  09/26/20  21:19    


 


Hgb  9.9 gm/dl (10.1-14.3)  L  09/26/20  21:19    


 


Hct  29.7 % (30.3-42.9)  L  09/26/20  21:19    


 


MCV  96 fl (79-97)   09/26/20  21:19    


 


MCH  32 pg (28-32)   09/26/20  21:19    


 


MCHC  33 % (30-34)   09/26/20  21:19    


 


RDW  14.7 % (13.2-15.2)   09/26/20  21:19    


 


Plt Count  682 K/mm3 (140-440)  H  09/26/20  21:19    


 


Lymph % (Auto)  15.0 % (13.4-35.0)   09/26/20  21:19    


 


Mono % (Auto)  8.8 % (0.0-7.3)  H  09/26/20  21:19    


 


Eos % (Auto)  0.3 % (0.0-4.3)   09/26/20  21:19    


 


Baso % (Auto)  1.3 % (0.0-1.8)   09/26/20  21:19    


 


Lymph # (Auto)  0.9 K/mm3 (1.2-5.4)  L  09/26/20  21:19    


 


Mono # (Auto)  0.6 K/mm3 (0.0-0.8)   09/26/20  21:19    


 


Eos # (Auto)  0.0 K/mm3 (0.0-0.4)   09/26/20  21:19    


 


Baso # (Auto)  0.1 K/mm3 (0.0-0.1)   09/26/20  21:19    


 


Seg Neutrophils %  74.6 % (40.0-70.0)  H  09/26/20  21:19    


 


Seg Neutrophils #  4.7 K/mm3 (1.8-7.7)   09/26/20  21:19    


 


PT  13.5 Sec. (12.2-14.9)   09/26/20  21:19    


 


INR  1.02  (0.87-1.13)   09/26/20  21:19    


 


APTT  29.4 Sec. (24.2-36.6)   09/26/20  21:19    


 


Sodium  143 mmol/L (137-145)   09/26/20  21:19    


 


Potassium  3.6 mmol/L (3.6-5.0)   09/26/20  21:19    


 


Chloride  103.8 mmol/L ()   09/26/20  21:19    


 


Carbon Dioxide  27 mmol/L (22-30)   09/26/20  21:19    


 


Anion Gap  16 mmol/L  09/26/20  21:19    


 


BUN  12 mg/dL (7-17)   09/26/20  21:19    


 


Creatinine  0.5 mg/dL (0.6-1.2)  L  09/26/20  21:19    


 


Estimated GFR  > 60 ml/min  09/26/20  21:19    


 


BUN/Creatinine Ratio  24 %  09/26/20  21:19    


 


Glucose  92 mg/dL ()   09/26/20  21:19    


 


Calcium  8.8 mg/dL (8.4-10.2)   09/26/20  21:19    


 


Magnesium  2.10 mg/dL (1.7-2.3)   09/26/20  21:19    


 


Total Bilirubin  0.40 mg/dL (0.1-1.2)   09/26/20  21:19    


 


AST  40 units/L (5-40)   09/26/20  21:19    


 


ALT  27 units/L (7-56)   09/26/20  21:19    


 


Alkaline Phosphatase  81 units/L ()   09/26/20  21:19    


 


Total Creatine Kinase  417 units/L ()  H  09/26/20  21:19    


 


Troponin T  0.023 ng/mL (0.00-0.029)   09/26/20  21:19    


 


Total Protein  6.2 g/dL (6.3-8.2)  L  09/26/20  21:19    


 


Albumin  3.1 g/dL (3.9-5)  L  09/26/20  21:19    


 


Albumin/Globulin Ratio  1.0 %  09/26/20  21:19    


 


TSH  1.900 mlU/mL (0.270-4.200)   09/26/20  21:19    


 


Free T4  1.23 ng/dL (0.76-1.46)   09/26/20  21:19    


 


Urine Color  Yellow  (Yellow)   09/26/20  22:54    


 


Urine Turbidity  Slightly-cloudy  (Clear)   09/26/20  22:54    


 


Urine pH  7.0  (5.0-7.0)   09/26/20  22:54    


 


Ur Specific Gravity  1.017  (1.003-1.030)   09/26/20  22:54    


 


Urine Protein  <15 mg/dl mg/dL (Negative)   09/26/20  22:54    


 


Urine Glucose (UA)  Neg mg/dL (Negative)   09/26/20  22:54    


 


Urine Ketones  20 mg/dL (Negative)   09/26/20  22:54    


 


Urine Blood  Neg  (Negative)   09/26/20  22:54    


 


Urine Nitrite  Neg  (Negative)   09/26/20  22:54    


 


Urine Bilirubin  Neg  (Negative)   09/26/20  22:54    


 


Urine Urobilinogen  4.0 mg/dL (<2.0)   09/26/20  22:54    


 


Ur Leukocyte Esterase  Neg  (Negative)   09/26/20  22:54    


 


Urine WBC (Auto)  0.0 /HPF (0.0-6.0)   09/26/20  22:54    


 


Urine RBC (Auto)  3.0 /HPF (0.0-6.0)   09/26/20  22:54    


 


U Epithel Cells (Auto)  < 1.0 /HPF (0-13.0)   09/26/20  22:54    


 


Urine Bacteria (Auto)  1+ /HPF (Negative)   09/26/20  22:54    











Microbiology: 


Microbiology





09/26/20 22:05   Stool   Stool Occult Blood (BRIGIDO) - Final








Mendez/IV: 


                                        





Voiding Method                   Incontinent


IV Catheter Type [Left           Peripheral IV


External Jugular]                











Active Medications





- Current Medications


Current Medications: 














Generic Name Dose Route Start Last Admin





  Trade Name Freq  PRN Reason Stop Dose Admin


 


Acetaminophen  650 mg  09/27/20 00:23 





  Tylenol  PO  





  Q4H PRN  





  Pain MILD(1-3)/Fever >100.5/HA  


 


Sodium Chloride  1,000 mls @ 75 mls/hr  09/27/20 00:30  09/27/20 02:40





  Nacl 0.9% 1000 Ml  IV   75 mls/hr





  AS DIRECT JOSE   Administration


 


Magnesium Hydroxide  30 ml  09/27/20 00:23 





  Milk Of Magnesia  PO  





  Q4H PRN  





  Constipation  


 


Ondansetron HCl  4 mg  09/27/20 00:23 





  Zofran  IV  





  Q8H PRN  





  Nausea And Vomiting  


 


Sodium Chloride  10 ml  09/27/20 10:00 





  Sodium Chloride Flush Syringe 10 Ml  IV  





  BID JOSE  


 


Sodium Chloride  10 ml  09/27/20 00:23 





  Sodium Chloride Flush Syringe 10 Ml  IV  





  PRN PRN  





  LINE FLUSH

## 2020-09-28 VITALS — SYSTOLIC BLOOD PRESSURE: 137 MMHG | DIASTOLIC BLOOD PRESSURE: 73 MMHG

## 2020-09-28 LAB
BASOPHILS # (AUTO): 0 K/MM3 (ref 0–0.1)
BASOPHILS NFR BLD AUTO: 0.3 % (ref 0–1.8)
BUN SERPL-MCNC: 15 MG/DL (ref 7–17)
BUN/CREAT SERPL: 30 %
CALCIUM SERPL-MCNC: 8.4 MG/DL (ref 8.4–10.2)
EOSINOPHIL # BLD AUTO: 0 K/MM3 (ref 0–0.4)
EOSINOPHIL NFR BLD AUTO: 0 % (ref 0–4.3)
HCT VFR BLD CALC: 25.8 % (ref 30.3–42.9)
HEMOLYSIS INDEX: 5
HGB BLD-MCNC: 8.6 GM/DL (ref 10.1–14.3)
INR PPP: 1.08 (ref 0.87–1.13)
LYMPHOCYTES # BLD AUTO: 0.7 K/MM3 (ref 1.2–5.4)
LYMPHOCYTES NFR BLD AUTO: 11.2 % (ref 13.4–35)
MCHC RBC AUTO-ENTMCNC: 33 % (ref 30–34)
MCV RBC AUTO: 97 FL (ref 79–97)
MONOCYTES # (AUTO): 0.8 K/MM3 (ref 0–0.8)
MONOCYTES % (AUTO): 11.8 % (ref 0–7.3)
PLATELET # BLD: 517 K/MM3 (ref 140–440)
RBC # BLD AUTO: 2.65 M/MM3 (ref 3.65–5.03)

## 2020-09-28 RX ADMIN — POTASSIUM CHLORIDE SCH MEQ: 1500 TABLET, EXTENDED RELEASE ORAL at 09:00

## 2020-09-28 RX ADMIN — POTASSIUM CHLORIDE SCH MEQ: 1500 TABLET, EXTENDED RELEASE ORAL at 12:09

## 2020-09-28 RX ADMIN — Medication SCH ML: at 11:22

## 2020-09-28 RX ADMIN — PANTOPRAZOLE SODIUM SCH MG: 40 INJECTION, POWDER, FOR SOLUTION INTRAVENOUS at 09:33

## 2020-09-28 NOTE — DISCHARGE SUMMARY
Providers





- Providers


Date of Admission: 


09/27/20 00:12





Date of discharge: 09/28/20


Attending physician: 


AMY J KOCHERLA





                                        





09/26/20 23:59


Consult to Physician [CONS] Urgent 


   Comment: Dr. Nielsen spoke with Dr. Bowman @ [phone]


   Consulting Provider: TERESITA BOWMAN


   Physician Instructions: 


   Reason For Exam: guaic positive stool, mild anemia





09/27/20 00:14


Consult to Case Management [CONS] Urgent 


   Services Needed at Discharge: Home Health Services


                                   Physical Therapy


   Notified:: n





09/27/20 04:28


Consult to Physician [CONS] Routine 


   Comment: 


   Consulting Provider: SUGAR CORONEL


   Physician Instructions: 


   Reason For Exam: S/P LEFT HIP SURGERY, ALTERED MENTAL STATUS





09/27/20 04:52


Consult to Wound/ET Nurse [CONS] Routine 


   Reason For Exam: wound eval





09/27/20 16:20


Occupational Therapy Evaluate and Treat [CONS] Routine 


   Comment: 


   Reason For Exam: Evaluate and treat/DC needs


Physical Therapy For [facility] [CONS] Routine 


   Reason For Exam: Evaluate and treat





09/28/20 13:38


Consult to Dietitian/Nutrition [CONS] Routine 


   Physician Instructions: 


   Reason For Exam: 


   Reason for Consult: Write/Manage Tube Feeding











Primary care physician: 


PRIMARY CARE MD








Hospitalization


Reason for admission: Altered level of consciousness/poor oral 

intake/generalized weakness


Condition: Stable


Pertinent studies: 


CT head; no acute abnormalities


X-ray hip; stable postop findings


Chest x-ray





Hospital course: 


75-year-old female with known history of dementia, hypertension, history of 

protein calorie malnutrition recently discharged from this hospital after having

 a left hip surgery status post fall was admitted through  emergency room with 

altered mental status, declining mental status and poor oral intake and some 

diarrhea, family noticed some bloodstained in stool, H&H was closely monitored, 

evaluated by GI, no indication for endoscopy


Patient has poor oral intake, with mild difficulty in swallowing.  If no 

improvement Dobbhoff placement and feeds.


Today patient was evaluated by case management, hospice, and is being admitted 

to inpatient hospice.





Time of discharge patient is alert and awake minimally communicative, confused 

possible baseline dementia


Vital signs reviewed, not in acute distress


Physical examination prior to discharge, no new changes


Stable at discharge and transfer to inpatient hospice facility


Further evaluation and management per hospice medical director





Final diagnosis;


--Metabolic encephalopathy


--Heme + stool


--Mild anemia


--reactive thrombocytosis


-- Dementia


-- History of arthroplasty of left hip


   PT OT as needed


--Severe malnutrition; BMI 18.5


--DVT prophylaxis


--Full code status


Physical therapy/Occupational Therapy


Fall precautions


Disposition: [id number] ([facility])


Time spent for discharge: 35 min





Core Measure Documentation





- Palliative Care


Palliative Care/ Comfort Measures: Hospice Care





- Core Measures


Any of the following diagnoses?: none





Exam





- Constitutional


Vitals: 


                                        











Temp Pulse Resp BP Pulse Ox


 


 98.1 F   68   20   97/39   100 


 


 09/28/20 11:16  09/28/20 11:16  09/28/20 11:16  09/28/20 11:16  09/28/20 11:16











General appearance: Present: mild distress, cachectic, disheveled, other 

(Minimal communication)





- EENT


Eyes: Present: PERRL, EOM intact





- Neck


Neck: Present: supple, normal ROM





- Respiratory


Respiratory effort: normal


Respiratory: bilateral: diminished, negative: rales, rhonchi, wheezing





- Cardiovascular


Rhythm: regular


Heart Sounds: Present: S1 & S2





- Extremities


Extremities: no ischemia, No edema





- Abdominal


General gastrointestinal: Present: soft, non-tender, non-distended, normal bowel

 sounds





- Integumentary


Integumentary: Present: clear, warm





- Musculoskeletal


Musculoskeletal: strength equal bilaterally, generalized weakness





- Psychiatric


Psychiatric: other (Confused, dementia)





- Neurologic


Neurologic: other (Minimal movement)





Plan


Activity: advance as tolerated, fall precautions


Diet: other (Diet as tolerated)


Additional Instructions: Patient is being transferred to inpatient hospice 

facility.  Further management per medical director of hospice


Follow up with: 


PRIMARY CARE,MD [Primary Care Provider] - 7 Days

## 2020-09-28 NOTE — PROGRESS NOTE
Assessment and Plan


1.  Anemia - with Heme + stool.  No gregoria GI bleed noted.  Hgb down a bit more, 

but no gregoria GI bleed.  Anemia likely multifactorial and due to surgery, ACD.  

Pt is a poor candidate for endoscopic evaluation, and GI causes are possible but

low yield at this point.


- empiric PPI


- monitor H/H and transfuse as needed





2.  Diarrhea - after surgery.


- check stool for C diff and WBC





3.  Malnutrition - monitor po intake.  If poor, may need PEG.  Discussion 

initiated with pt's daughter.


- attempted to call daughter again today to see what their thought processes 

are, but no answer, and no message left.


- consider Dobhoff and support





4.  Goals of care - this needs to be discussed with family, and prognosis for 

meaningful recovery from surgery, with participation in PT considered and 

discussed with family.


- will defer to Hospitalist





5.  Hypokalemia - per Hospitalist.











Subjective


Date of service: 09/28/20


Interval history: 


Pt is nonverbal today.  No acute changes overnight.








Objective





- Constitutional


General appearance: Present: no acute distress, cachectic





- EENT


Eyes: PERRL, EOM intact





- Gastrointestinal


General gastrointestinal: Present: soft, non-tender





- Labs


CBC & Chem 7: 


                                 09/28/20 06:46





                                 09/28/20 06:46


Labs: 


                              Abnormal lab results











  09/28/20 09/28/20 Range/Units





  06:46 06:46 


 


RBC  2.65 L   (3.65-5.03)  M/mm3


 


Hgb  8.6 L   (10.1-14.3)  gm/dl


 


Hct  25.8 L   (30.3-42.9)  %


 


Plt Count  517 H   (140-440)  K/mm3


 


Lymph % (Auto)  11.2 L   (13.4-35.0)  %


 


Mono % (Auto)  11.8 H   (0.0-7.3)  %


 


Lymph # (Auto)  0.7 L   (1.2-5.4)  K/mm3


 


Seg Neutrophils %  76.7 H   (40.0-70.0)  %


 


Sodium   146 H  (137-145)  mmol/L


 


Potassium   2.9 L*  (3.6-5.0)  mmol/L


 


Chloride   107.8 H  ()  mmol/L


 


Creatinine   0.5 L  (0.6-1.2)  mg/dL














Medications & Allergies





- Medications


Allergies/Adverse Reactions: 


                                    Allergies





codeine Allergy (Verified 09/10/20 19:29)


   Unknown


Latex, Natural Rubber Allergy (Verified 11/06/14 13:31)


   Rash


morphine Allergy (Verified 11/06/14 13:31)


   "JUST MAKES ME FEEL SICK"








Home Medications: 


                                Home Medications











 Medication  Instructions  Recorded  Confirmed  Last Taken  Type


 


Acetaminophen [Acetaminophen TAB] 650 mg PO Q4H PRN  tablet 09/17/20  Unknown Rx


 


Enoxaparin 40 mg SUB-Q QDAY  syringe 09/17/20  Unknown Rx


 


Latanoprost 0.005% 0.005 drops OU QHS  bottle 09/17/20  Unknown Rx


 


Magnesium Hydroxide [Milk of 30 ml PO Q4H PRN  oral.liqd 09/17/20  Unknown Rx





Magnesia]     


 


Memantine 5 mg PO BID  tablet 09/17/20  Unknown Rx


 


amLODIPine 5 mg PO QHS  tablet 09/17/20  Unknown Rx


 


risperiDONE [RisperDAL] 0.25 mg PO QHS  tablet 09/17/20  Unknown Rx


 


Amlodipine Besylate [Norvasc] 5 mg PO DAILY #30 tablet 09/18/20  Unknown Rx


 


Aspirin 81 mg PO DAILY #30 09/18/20  Unknown Rx


 


Metoprolol 12.5 mg PO BID #60 tab 09/18/20  Unknown Rx


 


risperiDONE 0.25 mg PO QHS #30 cap 09/18/20  Unknown Rx











Active Medications: 














Generic Name Dose Route Start Last Admin





  Trade Name Freq  PRN Reason Stop Dose Admin


 


Acetaminophen  650 mg  09/27/20 00:23 





  Tylenol  PO  





  Q4H PRN  





  Pain MILD(1-3)/Fever >100.5/HA  


 


Sodium Chloride  1,000 mls @ 100 mls/hr  09/28/20 11:30 





  Nacl 0.9% 1000 Ml  IV  





  AS DIRECT JOSE  


 


Magnesium Hydroxide  30 ml  09/27/20 00:23 





  Milk Of Magnesia  PO  





  Q4H PRN  





  Constipation  


 


Ondansetron HCl  4 mg  09/27/20 00:23 





  Zofran  IV  





  Q8H PRN  





  Nausea And Vomiting  


 


Pantoprazole Sodium  40 mg  09/27/20 18:00  09/28/20 09:33





  Protonix  IV   40 mg





  QDAY JOSE   Administration


 


Potassium Chloride  40 meq  09/28/20 09:00  09/28/20 12:09





  K-Dur  PO  09/28/20 14:00  40 meq





  Q3H JOSE   Administration


 


Sodium Chloride  10 ml  09/27/20 10:00  09/28/20 11:22





  Sodium Chloride Flush Syringe 10 Ml  IV   10 ml





  BID JOSE   Administration


 


Sodium Chloride  10 ml  09/27/20 00:23 





  Sodium Chloride Flush Syringe 10 Ml  IV  





  PRN PRN  





  LINE FLUSH  














HEART Score





- HEART Score


Troponin: 


                                        











Troponin T  0.023 ng/mL (0.00-0.029)   09/26/20  21:19